# Patient Record
Sex: MALE | Race: BLACK OR AFRICAN AMERICAN
[De-identification: names, ages, dates, MRNs, and addresses within clinical notes are randomized per-mention and may not be internally consistent; named-entity substitution may affect disease eponyms.]

---

## 2017-01-09 NOTE — ER DOCUMENT REPORT
HPI





- HPI


Patient complains to provider of: cough, cold symptoms


Onset: Other - 4 days


Onset/Duration: Persistent


Quality of pain: Achy


Pain Level: 3


Context: 


Patient states he was diagnosed with pneumonia 4 days ago.  Patient complains 

of continued cough and shortness of breath with decreased energy.  Patient 

denies any fever.  Patient has been taking doxycycline and using albuterol to 

treat his symptoms.  Patient states that whenever he went to work out in the 

cold today his symptoms started to worsen


Associated Symptoms: Body/muscle aches, Productive cough, Shortness of breath.  

denies: Chest pain, Chills, Fever


Exacerbated by: Denies


Relieved by: Denies


Similar symptoms previously: Yes


Recently seen / treated by doctor: Yes





- ROS


ROS below otherwise negative: Yes


Systems Reviewed and Negative: Yes All other systems reviewed and negative





- CONSTITUTIONAL


Constitutional: DENIES: Fever, Chills





- EENT


EENT: REPORTS: Congestion





- CARDIOVASCULAR


Cardiovascular: DENIES: Chest pain





- RESPIRATORY


Respiratory: REPORTS: Trouble Breathing, Coughing





- GASTROINTESTINAL


Gastrointestinal: DENIES: Abdominal Pain, Nausea, Patient vomiting





- MUSCULOSKELETAL


Musculoskeletal: DENIES: Extremity pain, Back Pain, Neck Pain





- DERM


Skin Color: Normal


Skin Problems: None





Past Medical History





- General


Information source: Patient





- Social History


Smoking Status: Current Every Day Smoker


Chew tobacco use (# tins/day): No


Frequency of alcohol use: None


Drug Abuse: None


Occupation: construction


Lives with: Family


Family History: Reviewed & Not Pertinent, DM


Patient has suicidal ideation: No


Patient has homicidal ideation: No





- Medical History


Medical History: Negative


Past Surgical History: Reports: Hx Orthopedic Surgery - Right wrist tendon 

repair





- Immunizations


Hx Diphtheria, Pertussis, Tetanus Vaccination: Yes





Vertical Provider Document





- CONSTITUTIONAL


Agree With Documented VS: Yes


Exam Limitations: No Limitations


General Appearance: WD/WN, No Apparent Distress





- INFECTION CONTROL


TRAVEL OUTSIDE OF THE [place] IN LAST 30 DAYS: No





- HEENT


HEENT: Atraumatic, Normocephalic.  negative: Pharyngeal Exudate, Pharyngeal 

Tenderness, Pharyngeal Erythema, Tympanic Membrane Red, Tympanic Membrane 

Bulging





- NECK


Neck: Normal Inspection, Supple.  negative: Lymphadenopathy-Left, 

Lymphadenopathy-Right





- RESPIRATORY


Respiratory: No Respiratory Distress, Rhonchi.  negative: Rales


O2 Sat by Pulse Oximetry: 96





- CARDIOVASCULAR


Cardiovascular: Regular Rate, Regular Rhythm, No Murmur





- GI/ABDOMEN


Gastrointestinal: Abdomen Soft, Abdomen Non-Tender





- BACK


Back: Normal Inspection.  negative: CVA Tenderness-Right, CVA Tenderness-Left





- MUSCULOSKELETAL/EXTREMETIES


Musculoskeletal/Extremeties: MAEW, FROM, Non-Tender





- NEURO


Level of Consciousness: Awake, Alert, Appropriate


Motor/Sensory: No Motor Deficit





- DERM


Integumentary: Warm, Dry, No Rash





Course





- Vital Signs


Vital signs: 


 











Temp Pulse Resp BP Pulse Ox


 


 98.0 F   83   16   129/78 H  96 


 


 01/09/17 07:28  01/09/17 07:28  01/09/17 07:28  01/09/17 07:28  01/09/17 07:28














- Laboratory


Result Diagrams: 


 01/09/17 08:05





 01/09/17 08:05


Laboratory results interpreted by me: 





01/09/17 09:31


 Labs- Entire Visit











  01/09/17 01/09/17 01/09/17





  08:05 08:05 08:25


 


WBC  9.2  


 


RBC  5.04  


 


Hgb  14.7  


 


Hct  42.5  


 


MCV  84  


 


MCH  29.2  


 


MCHC  34.6  


 


RDW  14.6 H  


 


Plt Count  148 L  


 


Total Counted  100  


 


Seg Neutrophils %  Not Reportable  


 


Seg Neuts % (Manual)  55  


 


Lymphocytes %  Not Reportable  


 


Lymphocytes % (Manual)  31  


 


Monocytes %  Not Reportable  


 


Monocytes % (Manual)  14 H  


 


Eosinophils %  Not Reportable  


 


Eosinophils % (Manual)  0  


 


Basophils %  Not Reportable  


 


Basophils % (Manual)  0  


 


Absolute Neutrophils  Not Reportable  


 


Abs Neuts (Manual)  5.1  


 


Absolute Lymphocytes  Not Reportable  


 


Abs Lymphs (Manual)  2.9  


 


Absolute Monocytes  Not Reportable  


 


Abs Monocytes (Manual)  1.3  


 


Absolute Eosinophils  Not Reportable  


 


Absolute Eos (Manual)  0.0  


 


Absolute Basophils  Not Reportable  


 


Abs Basophils (Manual)  0.0  


 


Toxic Vacuolation  PRESENT  


 


Platelet Comment  DECREASED  


 


Anisocytosis  SLIGHT  


 


Tear Drop Cells  SLIGHT  


 


Sodium   143.9 


 


Potassium   3.9 


 


Chloride   105 


 


Carbon Dioxide   28 


 


Anion Gap   11 


 


BUN   8 


 


Creatinine   0.91 


 


Est GFR ( Amer)   > 60 


 


Est GFR (Non-Af Amer)   > 60 


 


Glucose   95 


 


Calcium   9.5 


 


Total Bilirubin   0.3 


 


Direct Bilirubin   0.0 


 


AST   24 


 


ALT   32 


 


Alkaline Phosphatase   79 


 


Total Protein   7.3 


 


Albumin   4.0 


 


Urine Color    YELLOW


 


Urine Appearance    CLEAR


 


Urine pH    8.0


 


Ur Specific Gravity    1.012


 


Urine Protein    NEGATIVE


 


Urine Glucose (UA)    NEGATIVE


 


Urine Ketones    NEGATIVE


 


Urine Blood    NEGATIVE


 


Urine Nitrite    NEGATIVE


 


Urine Bilirubin    NEGATIVE


 


Urine Urobilinogen    NEGATIVE


 


Ur Leukocyte Esterase    NEGATIVE


 


Urine WBC (Auto)    0


 


Squamous Epi Cells Auto    3


 


Urine Mucus (Auto)    RARE


 


Urine Ascorbic Acid    NEGATIVE














- Diagnostic Test


Radiology reviewed: Reports reviewed





Discharge





- Discharge


Clinical Impression: 


 Cough, hx of pneumonia





Upper respiratory infection


Qualifiers:


 URI type: unspecified URI Qualified Code(s): J06.9 - Acute upper respiratory 

infection, unspecified





Condition: Stable


Disposition: HOME, SELF-CARE


Instructions:  Pneumonia ([facility]), Antibiotic Therapy (formerly [facility])


Additional Instructions: 


Return immediately for any new or worsening symptoms





Followup with your primary care provider, call tomorrow to make a followup 

appointment





Finish out your antibiotic





Continue to use albuterol inhaler as needed to help with cough








Prescriptions: 


Benzonatate [Tessalon Perle 100 mg Capsule] 100 mg PO Q8HP PRN #20 cap


 PRN Reason: 


Naproxen [Naprosyn 250 Nmg Tablet] 1 tab PO BID #14 tablet


Forms:  Parent Work Note, Return to Work


[facility] 


[facility] [Provider Group] - Follow up as needed

## 2017-04-30 NOTE — ER DOCUMENT REPORT
HPI





- HPI


Patient complains to provider of: dental pain


Onset: Other - 3 weeks


Onset/Duration: Persistent


Quality of pain: Achy, Throbbing


Severity: Severe


Pain Level: 5


Context: 


Patient presents emergency department with complaints of dental pain for the 

past 3 weeks.  He reports cracked tooth for a while but the tooth just started 

hurting. He reports he's been using ibuprofen and orajel on his right lower 

back molar without relief of symptoms.  He denies fever vomiting diarrhea.  He 

reports he has an appointment or plans on getting an appointment with a dentist

, Dr. Dupree.


Associated Symptoms: None


Exacerbated by: Denies


Relieved by: Denies


Similar symptoms previously: No


Recently seen / treated by doctor: No





- DERM


Skin Color: Normal





Past Medical History





- General


Information source: Patient





- Social History


Smoking Status: Unknown if Ever Smoked


Cigarette use (# per day): No


Frequency of alcohol use: None


Drug Abuse: None


Lives with: Family


Family History: Reviewed & Not Pertinent, DM


Patient has suicidal ideation: No


Patient has homicidal ideation: No





- Medical History


Medical History: Negative


Renal/ Medical History: Denies: Hx Peritoneal Dialysis


Past Surgical History: Reports: Hx Orthopedic Surgery - Right wrist tendon 

repair





- Immunizations


Hx Diphtheria, Pertussis, Tetanus Vaccination: Yes





Vertical Provider Document





- CONSTITUTIONAL


Agree With Documented VS: Yes


Exam Limitations: No Limitations


General Appearance: WD/WN, Mild Distress - guarding mouth





- INFECTION CONTROL


TRAVEL OUTSIDE OF THE [place] IN LAST 30 DAYS: No





- HEENT


HEENT: Atraumatic, Normocephalic.  negative: Conjuctival Injection, Pharyngeal 

Erythema


Mouth Diagram: 


  __________________________














  __________________________





 1 - c/o pain, no erythema, pustule, swelling, opens mouth wide, no induration, 

no ludwigs








- NECK


Neck: Normal Inspection, Supple.  negative: Lymphadenopathy-Left, 

Lymphadenopathy-Right





- RESPIRATORY


Respiratory: Breath Sounds Normal, No Respiratory Distress


O2 Sat by Pulse Oximetry: 98





- CARDIOVASCULAR


Cardiovascular: Regular Rate, Regular Rhythm





- MUSCULOSKELETAL/EXTREMETIES


Musculoskeletal/Extremeties: MAEW, FROM





- NEURO


Level of Consciousness: Awake, Alert, Appropriate


Motor/Sensory: No Motor Deficit





- DERM


Integumentary: Warm, Dry





Course





- Re-evaluation


Re-evalutation: 





04/30/17 18:33


Patient instructed on penicillin and Percocet for pain.  Patient reports he has 

an appointment he plans on getting an appointment with a dentist Dr. Dupree 

tomorrow.  Patient instructed the medication will not take care of his problem.

  But will help him feel better.  He verbalized understanding that the 

definitive treatment will be follow-up with a dentist.





- Vital Signs


Vital signs: 


 











Temp Pulse Resp BP Pulse Ox


 


 98.6 F   73   16   162/102 H  98 


 


 04/30/17 18:03  04/30/17 18:03  04/30/17 18:03  04/30/17 18:03  04/30/17 18:03














Discharge





- Discharge


Clinical Impression: 


 Pain, dental, Elevated blood pressure reading





Condition: Stable


Disposition: HOME, SELF-CARE


Instructions:  Oral Narcotic Medication ([facility]), Toothache ([facility]), Penicillin V K (

[facility]), [facility], Dentist


Additional Instructions: 


*You have been evaluated for dental pain, elevated blood pressure reading


*Take medications as prescribed


*Follow up with a dentist this next week


*Return to ED for worsening condition, changes, needs











Monitor your blood pressure. Your blood pressure was elevated today.  This may 

be because you were anxious, in pain or because you need medication.  It is 

important to follow up with your primary care provider for full evaluation.


Prescriptions: 


Oxycodone HCl/Acetaminophen [Percocet 5-325 mg Tablet] 1 - 2 tab PO ASDIR PRN #

15 tablet


 PRN Reason: 


Penicillin V Potassium [Penicillin Vk 500 mg Tablet] 500 mg PO BID #20 tablet


Forms:  Elevated Blood Pressure

## 2018-03-08 NOTE — ER DOCUMENT REPORT
ED General





- General


Chief Complaint: Abdominal Pain


Stated Complaint: ABDOMINAL PAIN


Time Seen by Provider: 03/08/18 06:53


TRAVEL OUTSIDE OF THE [place] IN LAST 30 DAYS: No





- HPI


Patient complains to provider of: Abdominal pain


Notes: 





Patient coming in for diffuse crampy abdominal pain ongoing since last night.  

Patient states he had [employer] at around 9:00 workup around 2:00 with 

abdominal pain states slight nausea normal bowel movement no diarrhea no 

vomiting.  Patient otherwise is concerned that he may have diverticulitis.  

States that he was told by his mother-in-law that symptoms are similar to when 

she had a diverticular flare.  Patient states recently had an upper and lower 

GI scope with only a small polyp no signs of diverticulosis on the prescription 

this was done approximately less than a year ago.  Resting company upon my 

evaluation





- Related Data


Allergies/Adverse Reactions: 


 





No Known Allergies Allergy (Verified 04/30/17 18:01)


 











Past Medical History





- Social History


Smoking Status: Current Every Day Smoker


Chew tobacco use (# tins/day): No


Frequency of alcohol use: None


Drug Abuse: None


Family History: Reviewed & Not Pertinent, DM


Patient has suicidal ideation: No


Patient has homicidal ideation: No


Renal/ Medical History: Denies: Hx Peritoneal Dialysis


Past Surgical History: Reports: Hx Orthopedic Surgery - Right wrist tendon 

repair





- Immunizations


Hx Diphtheria, Pertussis, Tetanus Vaccination: Yes





Review of Systems





- Review of Systems


Constitutional: No symptoms reported


EENT: No symptoms reported


Cardiovascular: No symptoms reported


Respiratory: No symptoms reported


Gastrointestinal: Abdominal pain


Genitourinary: No symptoms reported


Male Genitourinary: No symptoms reported


Musculoskeletal: No symptoms reported


Skin: No symptoms reported


Hematologic/Lymphatic: No symptoms reported


Neurological/Psychological: No symptoms reported





Physical Exam





- Vital signs


Vitals: 


 











Temp Pulse Resp BP Pulse Ox


 


 98.6 F   66   17   141/77 H  97 


 


 03/08/18 06:48  03/08/18 06:48  03/08/18 06:48  03/08/18 06:48  03/08/18 06:48











Interpretation: Normal





- General


General appearance: Appears well, Alert





- HEENT


Head: Normocephalic, Atraumatic


Eyes: Normal


Pupils: PERRL





- Respiratory


Respiratory status: No respiratory distress


Chest status: Nontender


Breath sounds: Normal


Chest palpation: Normal





- Cardiovascular


Rhythm: Regular


Heart sounds: Normal auscultation


Murmur: No





- Abdominal


Inspection: Normal


Distension: No distension


Bowel sounds: Normal


Tenderness: Tender - Diffuse nonspecific tenderness no guarding or rebound.  No

: McBurney's point, Muniz's sign, Guarding, Rebound


Organomegaly: No organomegaly





- Back


Back: Normal, Nontender





- Extremities


General upper extremity: Normal inspection, Nontender, Normal color, Normal ROM

, Normal temperature


General lower extremity: Normal inspection, Nontender, Normal color, Normal ROM

, Normal temperature, Normal weight bearing.  No: Ilya's sign





- Neurological


Neuro grossly intact: Yes


Cognition: Normal


Orientation: AAOx4


Manning Coma Scale Eye Opening: Spontaneous


Manning Coma Scale Verbal: Oriented


Kerry Coma Scale Motor: Obeys Commands


Kerry Coma Scale Total: 15


Speech: Normal


Motor strength normal: LUE, RUE, LLE, RLE


Sensory: Normal





- Psychological


Associated symptoms: Normal affect, Normal mood





- Skin


Skin Temperature: Warm


Skin Moisture: Dry


Skin Color: Normal





Course





- Re-evaluation


Re-evalutation: 





03/08/18 13:55


The patient presents with abdominal pain without signs of peritonitis or other 

life-threatening or serious etiology. The patient appears stable for discharge 

and has been instructed to return immediately if the symptoms worsen in any way

, or in 8-12hr if not improved for re-evaluation. The patient has been 

instructed to return if the symptoms worsen or change in any way.  Laboratory 

values not reveal any significant pathology.  Patient more likely has a 

foodborne or viral illness causing crampy abdominal pain.  Patient was given 

nausea control pain control discharged home.





- Vital Signs


Vital signs: 


 











Temp Pulse Resp BP Pulse Ox


 


 98.6 F   66   20   136/78 H  100 


 


 03/08/18 06:48  03/08/18 06:48  03/08/18 07:58  03/08/18 07:58  03/08/18 07:58














- Laboratory


Result Diagrams: 


 03/08/18 07:00





 03/08/18 07:00


Laboratory results interpreted by me: 


 











  03/08/18 03/08/18





  07:00 07:00


 


WBC  11.3 H 


 


Glucose   117 H














Discharge





- Discharge


Clinical Impression: 


Abdominal pain


Qualifiers:


 Abdominal location: generalized Qualified Code(s): R10.84 - Generalized 

abdominal pain





Instructions:  Abdominal Pain ([facility]), Antispasmodics ([facility]), Gastroenteritis (

adult) ([facility])


Additional Instructions: 


Laboratory studies today did not reveal any significant pathology.  Your 

abdominal examination and history is consistent with either a foodborne illness 

or a viral illness causing her abdominal pain.  Please take medication as 

prescribed.  He may also take Tylenol for pain control.  Take Zofran for nausea 

control.  I recommend a bland diet mostly consistent with fluids and starchy 

foods.  Return to ER symptoms worsen.


Prescriptions: 


Dicyclomine HCl [Bentyl 20 mg Tablet] 20 mg PO QID #30 tablet


Ondansetron [Zofran Odt] 4 mg PO Q6 PRN #30 tab.rapdis


 PRN Reason: For Nausea/Vomiting


Forms:  Return to Work

## 2019-10-26 ENCOUNTER — HOSPITAL ENCOUNTER (EMERGENCY)
Dept: HOSPITAL 62 - ER | Age: 39
Discharge: HOME | End: 2019-10-26
Payer: SELF-PAY

## 2019-10-26 VITALS — DIASTOLIC BLOOD PRESSURE: 90 MMHG | SYSTOLIC BLOOD PRESSURE: 146 MMHG

## 2019-10-26 DIAGNOSIS — R53.1: ICD-10-CM

## 2019-10-26 DIAGNOSIS — R10.9: Primary | ICD-10-CM

## 2019-10-26 DIAGNOSIS — Z87.891: ICD-10-CM

## 2019-10-26 LAB
APPEARANCE UR: CLEAR
APTT PPP: (no result) S
BILIRUB UR QL STRIP: NEGATIVE
GLUCOSE UR STRIP-MCNC: NEGATIVE MG/DL
KETONES UR STRIP-MCNC: NEGATIVE MG/DL
NITRITE UR QL STRIP: NEGATIVE
PH UR STRIP: 8 [PH] (ref 5–9)
PROT UR STRIP-MCNC: NEGATIVE MG/DL
SP GR UR STRIP: 1
UROBILINOGEN UR-MCNC: NEGATIVE MG/DL (ref ?–2)

## 2019-10-26 PROCEDURE — 96374 THER/PROPH/DIAG INJ IV PUSH: CPT

## 2019-10-26 PROCEDURE — 99283 EMERGENCY DEPT VISIT LOW MDM: CPT

## 2019-10-26 PROCEDURE — 81001 URINALYSIS AUTO W/SCOPE: CPT

## 2019-10-26 NOTE — ER DOCUMENT REPORT
HPI





- HPI


Time Seen by Provider: 10/26/19 12:46


Pain Level: 5


Context: 





Patient is a 38-year-old male who presents to the emergency department with a 

chief complaint of low back pain.  Patient reports this has been present for 2 

days.  Patient reports this was a gradual onset.  Patient reports he is not 

having any urinary symptoms but did feel relief when he emptied his bladder.  

Patient denies blood in the urine.  Patient denies fever.  Patient reports he 

has had intermittent chills.  Patient states he does not have a history of 

kidney stones.  Patient reports that his back pain is bilateral.  Patient 

reports he did quit smoking cigarettes 1 month ago and has been coughing up a 

lot of clear mucus.  Patient reports he feels like he has a muscle tightness in 

his back and is unsure if this is from all the coughing he has done.  Patient 

reports he had a urinary tract infection in the past but that this seems 

different.  Patient reports his back pain is worse with movement.  Patient 

denies saddle anesthesia.  Patient denies radiation of his pain.  Patient denies

numbness or tingling to his lower extremities.  Patient denies injury, recent 

heavy lifting or a fall.





- REPRODUCTIVE


Reproductive: DENIES: Pregnant:





Past Medical History





- General


Information source: Patient





- Social History


Smoking Status: Former Smoker


Frequency of alcohol use: None


Drug Abuse: None


Lives with: Spouse/Significant other


Family History: Reviewed & Not Pertinent, DM


Patient has suicidal ideation: No


Patient has homicidal ideation: No





- Past Medical History


Cardiac Medical History: Reports: None


Pulmonary Medical History: Reports: None


EENT Medical History: Reports: None


Neurological Medical History: Reports: None


Endocrine Medical History: Reports: None


Renal/ Medical History: Reports: None.  Denies: Hx Peritoneal Dialysis


Malignancy Medical History: Reports None


GI Medical History: Reports: None


Musculoskeletal Medical History: Reports None


Skin Medical History: Reports None


Psychiatric Medical History: Reports: None


Traumatic Medical History: Reports: None


Infectious Medical History: Reports: None


Past Surgical History: Reports: Hx Orthopedic Surgery - Right wrist tendon 

repair





- Immunizations


Hx Diphtheria, Pertussis, Tetanus Vaccination: Yes





Vertical Provider Document





- CONSTITUTIONAL


Agree With Documented VS: Yes


Exam Limitations: No Limitations


General Appearance: No Apparent Distress





- INFECTION CONTROL


TRAVEL OUTSIDE OF THE [place] IN LAST 30 DAYS: No





- HEENT


HEENT: Atraumatic, Normocephalic, PERRLA





- NECK


Neck: Normal Inspection





- RESPIRATORY


Respiratory: Breath Sounds Normal, No Respiratory Distress


Notes: 





Intermittent dry cough.





- CARDIOVASCULAR


Cardiovascular: Regular Rate, Regular Rhythm





- GI/ABDOMEN


Gastrointestinal: Abdomen Soft, Abdomen Non-Tender, Normal Bowel Sounds





- BACK


Notes: 





No CVA tenderness.  Patient has point tenderness to bilateral latissimus dorsi 

in the thoracolumbar fascia.  No cervical, thoracic or lumbar midline 

tenderness.





- MUSCULOSKELETAL/EXTREMETIES


Musculoskeletal/Extremeties: FROM, No Edema





- NEURO


Level of Consciousness: Awake, Alert, Appropriate





- DERM


Integumentary: Warm, Dry, No Rash





Course





- Re-evaluation


Re-evalutation: 





10/26/19 13:40


Patient reports feeling better after receiving the muscle relaxer and anti-

inflammatory.  I did inform the patient that his urinalysis was negative but 

return for any new or worsening symptoms.  I am treating the patient for a 

muscle strain.  Strict return precautions given.  Patient continues to deny 

radiation of pain.  Patient reports last Sunday he did cough up a lot of clear 

phlegm.  Patient reports he thinks is from stopping smoking.





- Vital Signs


Vital signs: 


                                        











Temp Pulse Resp BP Pulse Ox


 


 99.0 F   71   18   146/90 H  96 


 


 10/26/19 12:21  10/26/19 12:21  10/26/19 12:21  10/26/19 12:21  10/26/19 12:21














- Laboratory


Laboratory results interpreted by me: 





10/26/19 13:20


Laboratory











  10/26/19





  13:00


 


Urine Color  STRAW


 


Urine Appearance  CLEAR


 


Urine pH  8.0


 


Ur Specific Gravity  1.005


 


Urine Protein  NEGATIVE


 


Urine Glucose (UA)  NEGATIVE


 


Urine Ketones  NEGATIVE


 


Urine Blood  NEGATIVE


 


Urine Nitrite  NEGATIVE


 


Urine Bilirubin  NEGATIVE


 


Urine Urobilinogen  NEGATIVE


 


Ur Leukocyte Esterase  NEGATIVE


 


Urine WBC (Auto)  2


 


Urine RBC (Auto)  0


 


Urine Mucus (Auto)  RARE


 


Urine Ascorbic Acid  NEGATIVE














Discharge





- Discharge


Clinical Impression: 


 Muscle strain





Back pain


Qualifiers:


 Back pain location: low back pain Chronicity: acute Back pain laterality: 

bilateral Sciatica presence: without sciatica Qualified Code(s): M54.5 - Low 

back pain





Condition: Stable


Disposition: HOME, SELF-CARE


Additional Instructions: 


Today you are seen in the emergency department for back pain.  Your symptoms are

consistent with a muscle strain.  We will treat this with anti-inflammatories as

well as muscle relaxers.  We did check a urinalysis which did not indicate an 

infection of the urine or blood in the urine.  Please use ice packs to the area.

 It is unsure what is causes pain as you do not report an injury.  This could be

from all the coughing and spitting that you have been doing since you have quit 

smoking.  Please return emergency department if your symptoms change or worsen. 

Please return if you have radiation of pain, numbness or tingling down the back 

of your leg or weakness in the leg, fever or any other concerning signs or 

symptoms.











LOW BACK PAIN:





     Three out of every four people will have an episode of disabling back pain 

during their lifetime.  Most commonly the pain is due to straining of the 

muscles and ligaments in the low back.


     Usual treatment includes: 


(1) Rest on a firm surface.  Avoid lying on your stomach.  


(2) Ice pack the painful area.  After a few days, gentle heat may be used 

intermittently to relax the area, or ice packs can be continued.  


(3) Medication may be needed -- muscle relaxers and antiinflammatory medicines 

are commonly used.  


(4) As the back improves, exercises are prescribed to strengthen the back and 

abdominal muscles.


     Your doctor will advise you on the proper care for your back at each stage 

in your recovery.  You may be better in a few days -- or healing may take 

several weeks.


     If new symptoms of a "herniated disc" (radiation of pain, numbness, or 

tingling down the back of the leg or weakness in the leg) occur, you should be 

re-examined.  Further testing may be necessary.








PAIN MEDICATION INJECTION:


     You have received an injection of a pain medication.  You should experience

significant pain relief within 45 minutes.  If this injection was a narcotic -- 

it will impair your judgement, slow your reaction time and make you sleepy (as 

well as relieve your pain).  Narcotics also can cause nausea.


     You should not drive, work with machinery, or perform any task requiring 

mental alertness until all effects of the medication are gone -- six to eight 

hours.  Do not take any alcohol, or sedatives, and do not take any other 

medication without checking with your physician.








MUSCLE RELAXERS: 


     Muscle relaxing medications are usually prescribed for acute muscle spasm 

or injury to the neck and back.  They are often combined with antiinflammatory 

pain medication for increased relief.


     You may stop the muscle relaxer when the pain and stiffness have improved. 

Start the medication again if spasms recur.  


     Muscle relaxers may cause drowsiness, especially with the first dose.  Do 

not operate machinery or drive while under the effects of the medication.  Most 

muscle relaxers last up to 24 hours.  Do not combine the medication with 

alcohol.








ICE PACKS:


     Apply ice packs frequently against the painful area.  Many different 

schedules are recommended, such as "20 minutes on, 20 minutes off" or "one hour 

ice, two hours rest."  If you need to work, you may need to go longer between 

ice treatments.  You should plan to have the area ice packed AT LEAST one fourth

of the time.


     The ice should be applied over the wrap, tape, or splint, or over a layer 

of cloth -- not directly against the skin.  Some ice bags have a built-in cloth 

and can be put directly on the skin.





WARM PACKS:


     After approximately two days, apply gentle heat (such as a heating pad or 

hot water bottle) for about 20 to 30 minutes about every two hours -- at least 

four times daily.  Warmth and elevation will help you make a more rapid 

recovery, and will ease the pain considerably.


     Do not use HOT heat, and never apply heat for longer than 30 minutes.  The 

continuous heat can invisibly damage skin and muscles -- even when no burn is 

seen on the surface.  Damaged muscles can make you MORE sore.








FOLLOW-UP CARE:


If you have been referred to a physician for follow-up care, call the 

physicians office for an appointment as you were instructed or within the next 

two days.  If you experience worsening or a significant change in your symptoms,

notify the physician immediately or return to the Emergency Department at any 

time for re-evaluation.


Prescriptions: 


Naproxen 500 mg PO BID PRN #14 tablet


 PRN Reason: 


Methocarbamol [Robaxin 500 mg Tablet] 1,000 mg PO TID PRN #21 tablet


 PRN Reason: 


Forms:  Return to Work

## 2019-10-27 ENCOUNTER — HOSPITAL ENCOUNTER (EMERGENCY)
Dept: HOSPITAL 62 - ER | Age: 39
LOS: 1 days | Discharge: HOME | End: 2019-10-28
Payer: SELF-PAY

## 2019-10-27 DIAGNOSIS — R10.9: Primary | ICD-10-CM

## 2019-10-27 DIAGNOSIS — R53.1: ICD-10-CM

## 2019-10-27 DIAGNOSIS — R19.7: ICD-10-CM

## 2019-10-27 LAB
ADD MANUAL DIFF: NO
ALBUMIN SERPL-MCNC: 4.6 G/DL (ref 3.5–5)
ALP SERPL-CCNC: 72 U/L (ref 38–126)
ANION GAP SERPL CALC-SCNC: 13 MMOL/L (ref 5–19)
AST SERPL-CCNC: 23 U/L (ref 17–59)
BASOPHILS # BLD AUTO: 0.1 10^3/UL (ref 0–0.2)
BASOPHILS NFR BLD AUTO: 0.6 % (ref 0–2)
BILIRUB DIRECT SERPL-MCNC: 0.2 MG/DL (ref 0–0.4)
BILIRUB SERPL-MCNC: 0.4 MG/DL (ref 0.2–1.3)
BUN SERPL-MCNC: 12 MG/DL (ref 7–20)
CALCIUM: 9.8 MG/DL (ref 8.4–10.2)
CHLORIDE SERPL-SCNC: 108 MMOL/L (ref 98–107)
CO2 SERPL-SCNC: 24 MMOL/L (ref 22–30)
EOSINOPHIL # BLD AUTO: 0.2 10^3/UL (ref 0–0.6)
EOSINOPHIL NFR BLD AUTO: 1.3 % (ref 0–6)
ERYTHROCYTE [DISTWIDTH] IN BLOOD BY AUTOMATED COUNT: 14.2 % (ref 11.5–14)
GLUCOSE SERPL-MCNC: 95 MG/DL (ref 75–110)
HCT VFR BLD CALC: 43.3 % (ref 37.9–51)
HGB BLD-MCNC: 14.5 G/DL (ref 13.5–17)
LYMPHOCYTES # BLD AUTO: 3.9 10^3/UL (ref 0.5–4.7)
LYMPHOCYTES NFR BLD AUTO: 20.7 % (ref 13–45)
MCH RBC QN AUTO: 29 PG (ref 27–33.4)
MCHC RBC AUTO-ENTMCNC: 33.5 G/DL (ref 32–36)
MCV RBC AUTO: 87 FL (ref 80–97)
MONOCYTES # BLD AUTO: 2.4 10^3/UL (ref 0.1–1.4)
MONOCYTES NFR BLD AUTO: 12.6 % (ref 3–13)
NEUTROPHILS # BLD AUTO: 12.2 10^3/UL (ref 1.7–8.2)
NEUTS SEG NFR BLD AUTO: 64.8 % (ref 42–78)
PLATELET # BLD: 184 10^3/UL (ref 150–450)
POTASSIUM SERPL-SCNC: 3.9 MMOL/L (ref 3.6–5)
PROT SERPL-MCNC: 8.1 G/DL (ref 6.3–8.2)
RBC # BLD AUTO: 4.99 10^6/UL (ref 4.35–5.55)
TOTAL CELLS COUNTED % (AUTO): 100 %
WBC # BLD AUTO: 18.8 10^3/UL (ref 4–10.5)

## 2019-10-27 PROCEDURE — 80053 COMPREHEN METABOLIC PANEL: CPT

## 2019-10-27 PROCEDURE — 71045 X-RAY EXAM CHEST 1 VIEW: CPT

## 2019-10-27 PROCEDURE — 36415 COLL VENOUS BLD VENIPUNCTURE: CPT

## 2019-10-27 PROCEDURE — 96375 TX/PRO/DX INJ NEW DRUG ADDON: CPT

## 2019-10-27 PROCEDURE — 74177 CT ABD & PELVIS W/CONTRAST: CPT

## 2019-10-27 PROCEDURE — 96374 THER/PROPH/DIAG INJ IV PUSH: CPT

## 2019-10-27 PROCEDURE — 85025 COMPLETE CBC W/AUTO DIFF WBC: CPT

## 2019-10-27 PROCEDURE — 99283 EMERGENCY DEPT VISIT LOW MDM: CPT

## 2019-10-27 PROCEDURE — 96361 HYDRATE IV INFUSION ADD-ON: CPT

## 2019-10-27 PROCEDURE — 83690 ASSAY OF LIPASE: CPT

## 2019-10-27 NOTE — RADIOLOGY REPORT (SQ)
EXAM DESCRIPTION: 



XR CHEST 1 VIEW



COMPLETED DATE/TME:  10/27/2019 22:53



CLINICAL HISTORY: 



38 years, Male, chest pain



COMPARISON:

None.



NUMBER OF VIEWS:





TECHNIQUE:





LIMITATIONS:

None.



FINDINGS:



No evidence of pulmonary infiltrate or pleural effusion.



The heart and mediastinum are unremarkable.



Pulmonary vascularity appears normal.



IMPRESSION:



Normal chest x-ray.

 



copyright 2011 [employer]- All Rights Reserved

## 2019-10-27 NOTE — ER DOCUMENT REPORT
ED General





- General


Chief Complaint: General Weakness


Stated Complaint: WEAKNESS


Time Seen by Provider: 10/27/19 22:38


TRAVEL OUTSIDE OF THE [place] IN LAST 30 DAYS: No





- HPI


Notes: 





38-year-old male presents with vague generalized weakness and "spitting up".  

Patient is somewhat of a poor historian, indicates that he over the last couple 

of days has been "spitting up".  When pressed extensively, initially unclear 

whether he is actually vomiting or coughing up phlegm but he is adamant that he 

is "spitting up phlegm".  Not eating as much as normal.  Has some vague mid 

abdominal pain that comes and goes.  No diarrhea.  No recent travel, no recent 

antibiotic use.  No fever, chills or sweats.  Was seen here yesterday with back 

pain, had a negative UA and was felt to have some type of chronic back pain 

issue.  Moderate intensity, gradual onset, nonradiating.  No other modifying 

factors, no other associated symptoms, no other provocative or palliative 

factors.





- Related Data


Allergies/Adverse Reactions: 


                                        





No Known Allergies Allergy (Verified 04/30/17 18:01)


   











Past Medical History





- Social History


Smoking Status: Former Smoker


Family History: Reviewed & Not Pertinent, DM


Patient has suicidal ideation: No


Patient has homicidal ideation: No





- Medical History


Notes: 





Denies any acute medical issues


Renal/ Medical History: Denies: Hx Peritoneal Dialysis


Past Surgical History: Reports: Hx Orthopedic Surgery - Right wrist tendon 

repair





- Immunizations


Hx Diphtheria, Pertussis, Tetanus Vaccination: Yes





Review of Systems





- Review of Systems


Notes: 





Review of systems as in the history of present illness, otherwise negative x 10 

systems.





Physical Exam





- Vital signs


Vitals: 


                                        











Temp Pulse Resp BP Pulse Ox


 


 98.0 F   70   20   159/95 H  99 


 


 10/27/19 22:03  10/27/19 22:03  10/27/19 22:03  10/27/19 22:03  10/27/19 22:03














- Notes


Notes: 





General:  Well developed .


HEENT:  Normocephalic, atraumatic. Pupils equal round reactive to light.  No 

JVD.


Chest: No trauma.


Respiratory: Good air exchange, normal excursion.


Cardiac: Regular rhythm. No murmurs or gallops.


Abdomen: Soft, benign.  Mild epigastric mid abdominal tenderness


Back: No asymmetry or gross abnormality.


Motor: Grossly normal power and tone.


Neurologic: Alert, nonfocal. Cranial nerves II-12 are intact. Sensation intact.


Vascular: Well perfused. Normal peripheral pulses.


Skin: No petechiae or purpura.








Course





- Re-evaluation


Re-evalutation: 





10/27/19 22:55


Well-appearing 38-year male with the after mentioned symptoms.  Consider 

underlying viral illness, doubt obstruction, to be pink otitis gastritis peptic 

ulcer disease, less likely biliary tract disease.  We will proceed with IV 

fluids, antiemetics, serial exams, laboratory valuation and reassess.


10/28/19 03:19


Labs reviewed, leukocytosis is noted with a white count of 18.8.





Remainder chemistries and LFTs unremarkable.





Given the patient's been abdominal pain leukocytosis, consideration is given to 

intra-abdominal emergency such as perforation or diverticulitis with 

perforation.





CT imaging is obtained, ultimately shows no acute abnormality.





Patient is watched throughout his ED course and is had some improvement, did 

have an episode of diarrhea.  He is discharged home to follow close with his 

primary care physician, return if worsening.





- Vital Signs


Vital signs: 


                                        











Temp Pulse Resp BP Pulse Ox


 


 98.0 F   70   16   128/88 H  97 


 


 10/27/19 22:03  10/27/19 22:03  10/28/19 03:00  10/28/19 02:23  10/28/19 03:01














- Laboratory


Result Diagrams: 


                                 10/27/19 23:16





                                 10/27/19 23:16


Laboratory results interpreted by me: 


                                        











  10/27/19 10/27/19





  23:16 23:16


 


WBC  18.8 H 


 


RDW  14.2 H 


 


Absolute Neuts (auto)  12.2 H 


 


Absolute Monos (auto)  2.4 H 


 


Chloride   108 H














Discharge





- Discharge


Clinical Impression: 


Abdominal pain


Qualifiers:


 Abdominal location: unspecified location Qualified Code(s): R10.9 - Unspecified

abdominal pain





Condition: Stable


Disposition: HOME, SELF-CARE


Instructions:  Abdominal Pain ([facility])


Prescriptions: 


Dicyclomine HCl [Bentyl 20 mg Tablet] 20 mg PO Q8 #12 tablet


Ondansetron [Zofran Odt 4 mg Tablet] 1 - 2 tab PO Q4H PRN #15 tab.rapdis


 PRN Reason: For Nausea/Vomiting

## 2019-10-28 VITALS — DIASTOLIC BLOOD PRESSURE: 86 MMHG | SYSTOLIC BLOOD PRESSURE: 126 MMHG

## 2019-10-28 NOTE — RADIOLOGY REPORT (SQ)
CLINICAL HISTORY:  Abd pain and leukocytosis 



COMPARISON: None.



TECHNIQUE: CT ABDOMEN PELVIS WITH IV CONTRAST on 10/28/2019 12:00

AM CDT



This exam was performed according to our departmental

dose-optimization program, which includes automated exposure

control, adjustment of the mA and/or kV according to patient size

and/or use of iterative reconstruction technique.



FINDINGS: 



Lower lungs are clear.



Abdomen: The liver is normal in appearance. There is no biliary

dilatation. Gallbladder is normal in appearance. The pancreas and

spleen are normal in appearance. The adrenal glands and kidneys

are unremarkable.



Abdominal aorta is normal in course and caliber without aneurysm.

There is no free air. There is no retroperitoneal adenopathy.



Pelvis: There is no bowel obstruction. Urinary bladder is

unremarkable. There is no free fluid. Appendix is normal.



Skeleton: There are no acute osseous findings. No suspicious bony

lesions.



IMPRESSION: 



No acute process.

## 2019-11-02 ENCOUNTER — HOSPITAL ENCOUNTER (EMERGENCY)
Dept: HOSPITAL 62 - ER | Age: 39
LOS: 1 days | Discharge: HOME | End: 2019-11-03
Payer: SELF-PAY

## 2019-11-02 DIAGNOSIS — R06.02: ICD-10-CM

## 2019-11-02 DIAGNOSIS — R00.2: ICD-10-CM

## 2019-11-02 DIAGNOSIS — R07.9: Primary | ICD-10-CM

## 2019-11-02 PROCEDURE — 85025 COMPLETE CBC W/AUTO DIFF WBC: CPT

## 2019-11-02 PROCEDURE — 82553 CREATINE MB FRACTION: CPT

## 2019-11-02 PROCEDURE — 81001 URINALYSIS AUTO W/SCOPE: CPT

## 2019-11-02 PROCEDURE — 93005 ELECTROCARDIOGRAM TRACING: CPT

## 2019-11-02 PROCEDURE — 84484 ASSAY OF TROPONIN QUANT: CPT

## 2019-11-02 PROCEDURE — 82550 ASSAY OF CK (CPK): CPT

## 2019-11-02 PROCEDURE — 93010 ELECTROCARDIOGRAM REPORT: CPT

## 2019-11-02 PROCEDURE — 80053 COMPREHEN METABOLIC PANEL: CPT

## 2019-11-02 PROCEDURE — 71046 X-RAY EXAM CHEST 2 VIEWS: CPT

## 2019-11-02 PROCEDURE — 36415 COLL VENOUS BLD VENIPUNCTURE: CPT

## 2019-11-03 VITALS — DIASTOLIC BLOOD PRESSURE: 109 MMHG | SYSTOLIC BLOOD PRESSURE: 149 MMHG

## 2019-11-03 LAB
ADD MANUAL DIFF: NO
ADD MANUAL MICROSCOPIC: YES
ALBUMIN SERPL-MCNC: 4.2 G/DL (ref 3.5–5)
ALP SERPL-CCNC: 69 U/L (ref 38–126)
ANION GAP SERPL CALC-SCNC: 9 MMOL/L (ref 5–19)
APPEARANCE UR: CLEAR
APTT PPP: COLORLESS S
AST SERPL-CCNC: 17 U/L (ref 17–59)
BACTERIA #/AREA URNS HPF: (no result) /HPF
BASOPHILS # BLD AUTO: 0.1 10^3/UL (ref 0–0.2)
BASOPHILS NFR BLD AUTO: 0.7 % (ref 0–2)
BILIRUB DIRECT SERPL-MCNC: 0 MG/DL (ref 0–0.4)
BILIRUB SERPL-MCNC: 0.4 MG/DL (ref 0.2–1.3)
BILIRUB UR QL STRIP: NEGATIVE
BUN SERPL-MCNC: 8 MG/DL (ref 7–20)
CALCIUM: 9.4 MG/DL (ref 8.4–10.2)
CHLORIDE SERPL-SCNC: 106 MMOL/L (ref 98–107)
CK MB SERPL-MCNC: 0.36 NG/ML (ref ?–4.55)
CK SERPL-CCNC: 155 U/L (ref 55–170)
CO2 SERPL-SCNC: 28 MMOL/L (ref 22–30)
EOSINOPHIL # BLD AUTO: 0.2 10^3/UL (ref 0–0.6)
EOSINOPHIL NFR BLD AUTO: 1.4 % (ref 0–6)
ERYTHROCYTE [DISTWIDTH] IN BLOOD BY AUTOMATED COUNT: 13.8 % (ref 11.5–14)
GLUCOSE SERPL-MCNC: 92 MG/DL (ref 75–110)
GLUCOSE UR STRIP-MCNC: NEGATIVE MG/DL
HCT VFR BLD CALC: 38.8 % (ref 37.9–51)
HGB BLD-MCNC: 13.4 G/DL (ref 13.5–17)
KETONES UR STRIP-MCNC: NEGATIVE MG/DL
LYMPHOCYTES # BLD AUTO: 2.8 10^3/UL (ref 0.5–4.7)
LYMPHOCYTES NFR BLD AUTO: 24.4 % (ref 13–45)
MCH RBC QN AUTO: 29.8 PG (ref 27–33.4)
MCHC RBC AUTO-ENTMCNC: 34.4 G/DL (ref 32–36)
MCV RBC AUTO: 86 FL (ref 80–97)
MONOCYTES # BLD AUTO: 1 10^3/UL (ref 0.1–1.4)
MONOCYTES NFR BLD AUTO: 9 % (ref 3–13)
NEUTROPHILS # BLD AUTO: 7.5 10^3/UL (ref 1.7–8.2)
NEUTS SEG NFR BLD AUTO: 64.5 % (ref 42–78)
NITRITE UR QL STRIP: NEGATIVE
PH UR STRIP: 6 [PH] (ref 5–9)
PLATELET # BLD: 170 10^3/UL (ref 150–450)
POTASSIUM SERPL-SCNC: 3.5 MMOL/L (ref 3.6–5)
PROT SERPL-MCNC: 7.3 G/DL (ref 6.3–8.2)
PROT UR STRIP-MCNC: NEGATIVE MG/DL
RBC # BLD AUTO: 4.49 10^6/UL (ref 4.35–5.55)
RBC #/AREA URNS HPF: (no result) /HPF
SP GR UR STRIP: 1
TOTAL CELLS COUNTED % (AUTO): 100 %
TROPONIN I SERPL-MCNC: < 0.012 NG/ML
UROBILINOGEN UR-MCNC: NEGATIVE MG/DL (ref ?–2)
WBC # BLD AUTO: 11.6 10^3/UL (ref 4–10.5)
WBC #/AREA URNS HPF: (no result) /HPF

## 2019-11-03 NOTE — RADIOLOGY REPORT (SQ)
EXAM DESCRIPTION: 



XR CHEST 2 VIEWS



COMPLETED DATE/TME:  11/02/2019 00:00



CLINICAL HISTORY: 



38 years, Male, CP



COMPARISON:

10/27/2019 chest



NUMBER OF VIEWS:

2



TECHNIQUE:

2 view chest



LIMITATIONS:

None.



FINDINGS:



Heart size normal. Lungs clear. No pneumothorax



IMPRESSION:



Negative chest

 



copyright 2011 [employer]- All Rights Reserved

## 2019-11-03 NOTE — ER DOCUMENT REPORT
ED General





- General


Chief Complaint: Chest Pain


Stated Complaint: CHEST PAIN/LIGHTHEADED/SHORTNESS OF BREAH


Time Seen by Provider: 11/03/19 00:18


TRAVEL OUTSIDE OF THE [place] IN LAST 30 DAYS: No





- HPI


Notes: 





Patient is a 38-year-old male who presents emergency department for evaluation 

of left-sided chest palpitations.  He has been coughing, has attributed that to 

quitting smoking.  He is unsure as to whether or not he has bronchitis.  He took

an over-the-counter generic cough medicine/decongestant.  He states he started 

feeling his heart pounding.  He felt somewhat short of breath.  He states that 

this time he has no symptoms or concerns.  He really did not have any chest 

pain.  He does admit to some pain when pushing into the area, but at rest he 

does not have any pain at all.





- Related Data


Allergies/Adverse Reactions: 


                                        





No Known Allergies Allergy (Verified 04/30/17 18:01)


   








Home Medications: None





Past Medical History





- General


Information source: Patient





- Social History


Smoking Status: Former Smoker


Chew tobacco use (# tins/day): No


Frequency of alcohol use: None


Drug Abuse: None


Family History: Reviewed & Not Pertinent, DM


Patient has suicidal ideation: No


Patient has homicidal ideation: No


Renal/ Medical History: Denies: Hx Peritoneal Dialysis


Past Surgical History: Reports: Hx Orthopedic Surgery - Right wrist tendon 

repair





- Immunizations


Hx Diphtheria, Pertussis, Tetanus Vaccination: Yes





Review of Systems





- Review of Systems


Constitutional: No symptoms reported


EENT: No symptoms reported


Cardiovascular: See HPI


Respiratory: See HPI


Gastrointestinal: No symptoms reported


Genitourinary: No symptoms reported


Musculoskeletal: No symptoms reported


Skin: No symptoms reported


Neurological/Psychological: No symptoms reported





Physical Exam





- Vital signs


Vitals: 


                                        











Temp Pulse Resp BP Pulse Ox


 


 98.0 F   59 L  18   163/109 H  97 


 


 11/02/19 23:14  11/02/19 23:14  11/02/19 23:14  11/02/19 23:14  11/02/19 23:14














- Notes


Notes: 





Vital signs reviewed, please refer to chart. Head is normocephalic, atraumatic. 

Pupils equal round, reactive to light.  Neck is supple without meningismus.  

Heart is regular rate and rhythm.  Lungs are clear to auscultation bilaterally. 

Abdomen is soft, nontender, normoactive bowel sounds throughout.  Extremities 

without cyanosis, clubbing. Posterior calves are nontender.  Peripheral pulses 

are equal.  Skin is warm and dry.  Patient is awake, alert, neurological exam is

nonfocal.





Course





- Re-evaluation


Re-evalutation: 





11/03/19 00:56


Patient presents emergency department for evaluation.  He had initially mention 

chest pain, but his complaint is more related to palpitations.  He just started 

an over-the-counter cough medications, I suspect that that is the cause.  He has

no chest pain, but he does have some chest tenderness.  Laboratory vesication's 

were obtained, EKG is unremarkable.  We will continue to monitor.


11/03/19 02:27


Patient remained chest pain-free throughout the course of his stay.  He did not 

have any significant palpitations.  Monitor did not reveal any significant 

ectopic runs.  My suspicion is that this was from side effects of the cough 

medication.  His troponin was negative x2.  He is to follow-up with primary 

care, return to the ED with worsening or new concerning symptoms of any sort.





- Vital Signs


Vital signs: 


                                        











Temp Pulse Resp BP Pulse Ox


 


 98.0 F   59 L  17   146/104 H  97 


 


 11/02/19 23:14  11/02/19 23:14  11/03/19 02:01  11/03/19 02:01  11/03/19 00:24














- Laboratory


Result Diagrams: 


                                 11/03/19 00:21





                                 11/03/19 00:21


Laboratory results interpreted by me: 


                                        











  11/03/19 11/03/19





  00:21 00:21


 


WBC  11.6 H 


 


Hgb  13.4 L 


 


Potassium   3.5 L














- Diagnostic Test


Radiology reviewed: Reports reviewed


Radiology results interpreted by me: 





11/03/19 02:28





                                        





Chest X-Ray  11/02/19 00:00


IMPRESSION:


 


Negative chest


 


 


copyright 2011 [employer]- All Rights Reserved


 














- EKG Interpretation by Me


Additional EKG results interpreted by me: 





11/03/19 00:57


Sinus megaly rate of 60 bpm.  Normal axis and intervals, no acute ST changes 

concerning for ischemia or infarction





Discharge





- Discharge


Clinical Impression: 


 Palpitations





Condition: Stable


Disposition: HOME, SELF-CARE


Instructions:  Palpitations (Irregular or Rapid Heartrate) ([facility])


Additional Instructions: 


Rest, stay well-hydrated.  Avoid the cough medicine you took last night in the 

future.  Follow-up with primary care next week.  Return to the emergency 

department with worsening or new concerning symptoms of any sort.